# Patient Record
Sex: MALE | Race: BLACK OR AFRICAN AMERICAN | NOT HISPANIC OR LATINO | Employment: UNEMPLOYED | URBAN - METROPOLITAN AREA
[De-identification: names, ages, dates, MRNs, and addresses within clinical notes are randomized per-mention and may not be internally consistent; named-entity substitution may affect disease eponyms.]

---

## 2018-03-22 ENCOUNTER — HOSPITAL ENCOUNTER (EMERGENCY)
Facility: MEDICAL CENTER | Age: 38
End: 2018-03-22
Attending: EMERGENCY MEDICINE

## 2018-03-22 VITALS
DIASTOLIC BLOOD PRESSURE: 84 MMHG | OXYGEN SATURATION: 98 % | RESPIRATION RATE: 14 BRPM | HEART RATE: 64 BPM | WEIGHT: 221.12 LBS | BODY MASS INDEX: 33.51 KG/M2 | TEMPERATURE: 98.2 F | HEIGHT: 68 IN | SYSTOLIC BLOOD PRESSURE: 137 MMHG

## 2018-03-22 DIAGNOSIS — R11.2 NON-INTRACTABLE VOMITING WITH NAUSEA, UNSPECIFIED VOMITING TYPE: ICD-10-CM

## 2018-03-22 DIAGNOSIS — R53.83 FATIGUE, UNSPECIFIED TYPE: ICD-10-CM

## 2018-03-22 LAB
ALBUMIN SERPL BCP-MCNC: 4.3 G/DL (ref 3.2–4.9)
ALBUMIN/GLOB SERPL: 1.4 G/DL
ALP SERPL-CCNC: 61 U/L (ref 30–99)
ALT SERPL-CCNC: 15 U/L (ref 2–50)
ANION GAP SERPL CALC-SCNC: 9 MMOL/L (ref 0–11.9)
APPEARANCE UR: CLEAR
AST SERPL-CCNC: 14 U/L (ref 12–45)
BASOPHILS # BLD AUTO: 0.4 % (ref 0–1.8)
BASOPHILS # BLD: 0.03 K/UL (ref 0–0.12)
BILIRUB SERPL-MCNC: 0.6 MG/DL (ref 0.1–1.5)
BILIRUB UR QL STRIP.AUTO: NEGATIVE
BUN SERPL-MCNC: 12 MG/DL (ref 8–22)
CALCIUM SERPL-MCNC: 9.6 MG/DL (ref 8.5–10.5)
CHLORIDE SERPL-SCNC: 103 MMOL/L (ref 96–112)
CO2 SERPL-SCNC: 28 MMOL/L (ref 20–33)
COLOR UR: YELLOW
CREAT SERPL-MCNC: 0.98 MG/DL (ref 0.5–1.4)
EOSINOPHIL # BLD AUTO: 0.15 K/UL (ref 0–0.51)
EOSINOPHIL NFR BLD: 2.1 % (ref 0–6.9)
ERYTHROCYTE [DISTWIDTH] IN BLOOD BY AUTOMATED COUNT: 39.7 FL (ref 35.9–50)
GLOBULIN SER CALC-MCNC: 3.1 G/DL (ref 1.9–3.5)
GLUCOSE SERPL-MCNC: 99 MG/DL (ref 65–99)
GLUCOSE UR STRIP.AUTO-MCNC: NEGATIVE MG/DL
HCT VFR BLD AUTO: 49.7 % (ref 42–52)
HGB BLD-MCNC: 17.7 G/DL (ref 14–18)
IMM GRANULOCYTES # BLD AUTO: 0.01 K/UL (ref 0–0.11)
IMM GRANULOCYTES NFR BLD AUTO: 0.1 % (ref 0–0.9)
KETONES UR STRIP.AUTO-MCNC: NEGATIVE MG/DL
LEUKOCYTE ESTERASE UR QL STRIP.AUTO: NEGATIVE
LIPASE SERPL-CCNC: 27 U/L (ref 11–82)
LYMPHOCYTES # BLD AUTO: 1.19 K/UL (ref 1–4.8)
LYMPHOCYTES NFR BLD: 16.5 % (ref 22–41)
MCH RBC QN AUTO: 31.3 PG (ref 27–33)
MCHC RBC AUTO-ENTMCNC: 35.6 G/DL (ref 33.7–35.3)
MCV RBC AUTO: 88 FL (ref 81.4–97.8)
MICRO URNS: NORMAL
MONOCYTES # BLD AUTO: 0.48 K/UL (ref 0–0.85)
MONOCYTES NFR BLD AUTO: 6.6 % (ref 0–13.4)
NEUTROPHILS # BLD AUTO: 5.36 K/UL (ref 1.82–7.42)
NEUTROPHILS NFR BLD: 74.3 % (ref 44–72)
NITRITE UR QL STRIP.AUTO: NEGATIVE
NRBC # BLD AUTO: 0 K/UL
NRBC BLD-RTO: 0 /100 WBC
PH UR STRIP.AUTO: 5 [PH]
PLATELET # BLD AUTO: 217 K/UL (ref 164–446)
PMV BLD AUTO: 9.7 FL (ref 9–12.9)
POTASSIUM SERPL-SCNC: 3.9 MMOL/L (ref 3.6–5.5)
PROT SERPL-MCNC: 7.4 G/DL (ref 6–8.2)
PROT UR QL STRIP: NEGATIVE MG/DL
RBC # BLD AUTO: 5.65 M/UL (ref 4.7–6.1)
RBC UR QL AUTO: NEGATIVE
SODIUM SERPL-SCNC: 140 MMOL/L (ref 135–145)
SP GR UR STRIP.AUTO: 1.03
UROBILINOGEN UR STRIP.AUTO-MCNC: 1 MG/DL
WBC # BLD AUTO: 7.2 K/UL (ref 4.8–10.8)

## 2018-03-22 PROCEDURE — 83690 ASSAY OF LIPASE: CPT

## 2018-03-22 PROCEDURE — 700111 HCHG RX REV CODE 636 W/ 250 OVERRIDE (IP): Performed by: EMERGENCY MEDICINE

## 2018-03-22 PROCEDURE — 36415 COLL VENOUS BLD VENIPUNCTURE: CPT

## 2018-03-22 PROCEDURE — 85025 COMPLETE CBC W/AUTO DIFF WBC: CPT

## 2018-03-22 PROCEDURE — 99284 EMERGENCY DEPT VISIT MOD MDM: CPT

## 2018-03-22 PROCEDURE — 81003 URINALYSIS AUTO W/O SCOPE: CPT

## 2018-03-22 PROCEDURE — 80053 COMPREHEN METABOLIC PANEL: CPT

## 2018-03-22 RX ORDER — ONDANSETRON 4 MG/1
4 TABLET, ORALLY DISINTEGRATING ORAL ONCE
Status: COMPLETED | OUTPATIENT
Start: 2018-03-22 | End: 2018-03-22

## 2018-03-22 RX ORDER — ONDANSETRON 4 MG/1
4 TABLET, FILM COATED ORAL EVERY 4 HOURS PRN
Qty: 10 TAB | Refills: 0 | Status: SHIPPED | OUTPATIENT
Start: 2018-03-22 | End: 2020-01-03

## 2018-03-22 RX ADMIN — ONDANSETRON 4 MG: 4 TABLET, ORALLY DISINTEGRATING ORAL at 21:43

## 2018-03-23 NOTE — ED TRIAGE NOTES
C/O diarrhea approx 5-7 x/d, nausea , vomited x 2 in the  last 7 d, feels weak and lethargic, sx began 1 week ago, congested, no cough

## 2018-03-23 NOTE — ED NOTES
Patient dc'd to home w/ self. Patient alert and oriented x 4. Patient ambulatory w/ steady gait. Patient verbalizes understanding of discharge instructions, follow up care, and prescription x 1. Work note provided to patient by ERP. Patient denies questions upon discharge.

## 2018-03-23 NOTE — DISCHARGE INSTRUCTIONS
Please call your primary care physician tomorrow for complete recheck. Return to the emergency department if you develop any new or worsening symptoms including worsening pain, nausea or vomiting, fevers, if you're not able to drink fluids, or if you have any further concerns.      Viral Gastroenteritis, Adult  Introduction  Viral gastroenteritis is also known as the stomach flu. This condition is caused by certain germs (viruses). These germs can be passed from person to person very easily (are very contagious). This condition can cause sudden watery poop (diarrhea), fever, and throwing up (vomiting).  Having watery poop and throwing up can make you feel weak and cause you to get dehydrated. Dehydration can make you tired and thirsty, make you have a dry mouth, and make it so you pee (urinate) less often. Older adults and people with other diseases or a weak defense system (immune system) are at higher risk for dehydration. It is important to replace the fluids that you lose from having watery poop and throwing up.  Follow these instructions at home:  Follow instructions from your doctor about how to care for yourself at home.  Eating and drinking  Follow these instructions as told by your doctor:  · Take an oral rehydration solution (ORS). This is a drink that is sold at pharmacies and stores.  · Drink clear fluids in small amounts as you are able, such as:  ¨ Water.  ¨ Ice chips.  ¨ Diluted fruit juice.  ¨ Low-calorie sports drinks.  · Eat bland, easy-to-digest foods in small amounts as you are able, such as:  ¨ Bananas.  ¨ Applesauce.  ¨ Rice.  ¨ Low-fat (lean) meats.  ¨ Toast.  ¨ Crackers.  · Avoid fluids that have a lot of sugar or caffeine in them.  · Avoid alcohol.  · Avoid spicy or fatty foods.  General instructions  · Drink enough fluid to keep your pee (urine) clear or pale yellow.  · Wash your hands often. If you cannot use soap and water, use hand .  · Make sure that all people in your home  wash their hands well and often.  · Rest at home while you get better.  · Take over-the-counter and prescription medicines only as told by your doctor.  · Watch your condition for any changes.  · Take a warm bath to help with any burning or pain from having watery poop.  · Keep all follow-up visits as told by your doctor. This is important.  Contact a doctor if:  · You cannot keep fluids down.  · Your symptoms get worse.  · You have new symptoms.  · You feel light-headed or dizzy.  · You have muscle cramps.  Get help right away if:  · You have chest pain.  · You feel very weak or you pass out (faint).  · You see blood in your throw-up.  · Your throw-up looks like coffee grounds.  · You have bloody or black poop (stools) or poop that look like tar.  · You have a very bad headache, a stiff neck, or both.  · You have a rash.  · You have very bad pain, cramping, or bloating in your belly (abdomen).  · You have trouble breathing.  · You are breathing very quickly.  · Your heart is beating very quickly.  · Your skin feels cold and clammy.  · You feel confused.  · You have pain when you pee.  · You have signs of dehydration, such as:  ¨ Dark pee, hardly any pee, or no pee.  ¨ Cracked lips.  ¨ Dry mouth.  ¨ Sunken eyes.  ¨ Sleepiness.  ¨ Weakness.  This information is not intended to replace advice given to you by your health care provider. Make sure you discuss any questions you have with your health care provider.  Document Released: 06/05/2009 Document Revised: 07/07/2017 Document Reviewed: 08/23/2016  © 2017 [employer]vier

## 2018-03-23 NOTE — ED PROVIDER NOTES
ED Provider Note    Scribed for Amber Estrada M.D. by Lydia Drake. 3/22/2018, 9:14 PM.    Primary care provider: None noted  Means of arrival: Walk in  History obtained from: Patient  History limited by: None     CHIEF COMPLAINT  Nausea and Vomiting    HPI  Rajat Luong is a 37 y.o. male who presents to the Emergency Department for evaluation of nausea and vomiting onset about 5 days ago. He reports the vomiting has been persistent, but has not experienced any episodes of emesis today. The patient endorses associated mild abdominal pain and abdominal cramping. He also reports diarrhea onset 5 days ago, but states it has recently improved. No alleviating or exacerbating factors are identified at this time. States he has been trying to drink plenty of fluids, reports no noticeable loss of appetite.    The patient additionally identifies multiple cold like symptoms that include mild headaches, mild muscle aches, generalized fatigue, minor nasal congestion, and decreased appetite. He also reports subjective fevers, but did not have a thermometer at home to check his temperature. The patient denies chest pain, shortness of breath, cough, or dysuria.     REVIEW OF SYSTEMS  Pertinent positives include nausea, vomiting, abdominal pain, abdominal cramping, diarrhea, headache, muscle aches, generalized fatigue, nasal congestion, and decreased appetite. Pertinent negatives include no chest pain, shortness of breath, cough, or dysuria.  All other systems reviewed and negative.  C.     PAST MEDICAL HISTORY   Migraines    SURGICAL HISTORY  patient denies any surgical history    SOCIAL HISTORY  Social History   Substance Use Topics   • Smoking status: No   • Smokeless tobacco: None noted   • Alcohol use None noted      History   Drug use: Unknown       FAMILY HISTORY  No pertinent family history noted.    CURRENT MEDICATIONS  Home Medications    **Home medications have not yet been reviewed for this encounter**    "      ALLERGIES  No Known Allergies    PHYSICAL EXAM  Vital Signs: /84   Pulse 68   Temp 36.8 °C (98.2 °F)   Resp 15   Ht 1.727 m (5' 8\")   Wt 100.3 kg (221 lb 1.9 oz)   SpO2 93%   BMI 33.62 kg/m²   Constitutional: Alert, no acute distress, obese  HENT: Normocephalic, atraumatic, moist mucus membranes  Eyes: Pupils equal and reactive, normal conjunctiva, non-icteric  Neck: Supple, normal range of motion, no stridor  Cardiovascular: Regular rhythm, Normal peripheral perfusion, no cyanosis, Normal cardiac auscultation  Pulmonary: No respiratory distress, normal work of breathing, no accessory muscle usage, Clear to auscultation bilaterally  Abdomen: Soft, non tender, no peritoneal signs, bowel sounds are present, no right lower quadrant tenderness to palpation, no right upper quadrant tenderness to palpation, abdomen is nondistended.   Skin: Warm, dry, no rashes or lesions  Back: No pain with active range of motion  Musculoskeletal: Normal range of motion in all extremities, no swelling or deformity noted  Neurologic: Alert, oriented, normal motor function, no speech deficits  Psychiatric: Normal and appropriate mood and affect    DIAGNOSTIC STUDIES/PROCEDURES:    LABS  Labs Reviewed   CBC WITH DIFFERENTIAL - Abnormal; Notable for the following:        Result Value    MCHC 35.6 (*)     Neutrophils-Polys 74.30 (*)     Lymphocytes 16.50 (*)     All other components within normal limits   COMP METABOLIC PANEL   URINALYSIS   LIPASE   ESTIMATED GFR     All labs reviewed by me.    COURSE & MEDICAL DECISION MAKING  Pertinent Labs & Imaging studies reviewed. (See chart for details)    No recent medical records available for review.     Differential diagnoses include but are not limited to: Gastroenteritis, electrolyte abnormality, dehydration, intra-abdominal infection including appendicitis, cholecystitis, cholelithiasis, pancreatitis    9:14 PM - Patient seen and examined at bedside. Patient will be treated " with Zofran 4 mg. Ordered Lipase, Urinalysis, CMP, and CBC to evaluate his symptoms.     Decision Making:  This is a 37 y.o. year old male who presents with history and physical exam is documented above. 4-5 days ago he developed diarrhea and vomiting, both of these symptoms have improved at this time. He does still have some increased fatigue and mild loss of appetite, but is drinking fluids well. States he is sleeping more than usual as well. He reports subjective fevers but has not taken his temperature. On arrival to the emergency department he has a normal heart rate, no tachypnea, he is afebrile, no evidence of SIRS or sepsis by vital signs. His abdominal exam is benign, soft, nontender on physical exam. He has no right lower quadrant tenderness to suggest appendicitis, no right upper quadrant tenderness to suggest cholecystitis or cholelithiasis. No epigastric tenderness to suggest pancreatitis.    He was treated with a dose of Zofran in the emergency department.    On laboratory evaluation he has no evidence of urinary tract infection. All electrolytes are within normal limits. His lipase is 27 without evidence of pancreatitis. He has normal liver enzymes, no evidence of hepatitis. His white blood count is within normal limits, again less concerning for SIRS, sepsis, or ongoing severe infection.    Based on the fact that his symptoms appear to be improving, suspect this is a resolving viral illness. I do not believe antibiotic treatment is indicated at this time. He has complained of some residual nausea, I will discharge him with a prescription for Zofran. He will follow-up with his primary care physician for recheck within 24-48 hours. Did  him that I suspect his symptoms will continue to improve, stressed prompt return if he develops any new or worsening symptoms, specifically abdominal pain, fevers, recurrent nausea or vomiting, recurrent diarrhea, or any further concerns.    The patient will  return for new or worsening symptoms and is stable at the time of discharge.    The patient is referred to a primary physician for blood pressure management, diabetic screening, and for all other preventative health concerns.    DISPOSITION:  Patient will be discharged home in stable condition.    FOLLOW UP:  [facility]  [address] [place] [id number]  [phone]  Go in 1 day  For complete recheck    [facility], Emergency Dept  [address] [id number] [phone]  Go to  If symptoms worsen      OUTPATIENT MEDICATIONS:  Discharge Medication List as of 3/22/2018 11:08 PM      START taking these medications    Details   ondansetron (ZOFRAN) 4 MG Tab tablet 4 mg, EVERY 4 HOURS PRN Starting Thu 3/22/2018, Disp-10 Tab, R-0, Oral             FINAL IMPRESSION  1. Fatigue, unspecified type    2. Non-intractable vomiting with nausea, unspecified vomiting type          I, Lydia Drake (Chayoibmegan), am scribing for, and in the presence of, Amber Estrada M.D..    Electronically signed by: Lydia Drake (Scribe), 3/22/2018    IAmber M.D. personally performed the services described in this documentation, as scribed by Lydia Drake in my presence, and it is both accurate and complete.    The note accurately reflects work and decisions made by me.  Amber Estrada  3/22/2018  11:27 PM

## 2019-05-31 ENCOUNTER — NON-PROVIDER VISIT (OUTPATIENT)
Dept: OCCUPATIONAL MEDICINE | Facility: CLINIC | Age: 39
End: 2019-05-31

## 2019-05-31 DIAGNOSIS — Z02.1 PRE-EMPLOYMENT DRUG SCREENING: ICD-10-CM

## 2019-05-31 LAB
AMP AMPHETAMINE: NORMAL
COC COCAINE: NORMAL
INT CON NEG: NORMAL
INT CON POS: NORMAL
MET METHAMPHETAMINES: NORMAL
OPI OPIATES: NORMAL
PCP PHENCYCLIDINE: NORMAL
POC DRUG COMMENT 753798-OCCUPATIONAL HEALTH: NORMAL
THC: NORMAL

## 2019-05-31 PROCEDURE — 80305 DRUG TEST PRSMV DIR OPT OBS: CPT | Performed by: PREVENTIVE MEDICINE

## 2020-01-03 ENCOUNTER — APPOINTMENT (OUTPATIENT)
Dept: RADIOLOGY | Facility: IMAGING CENTER | Age: 40
End: 2020-01-03
Attending: FAMILY MEDICINE
Payer: COMMERCIAL

## 2020-01-03 ENCOUNTER — OCCUPATIONAL MEDICINE (OUTPATIENT)
Dept: URGENT CARE | Facility: CLINIC | Age: 40
End: 2020-01-03
Payer: COMMERCIAL

## 2020-01-03 VITALS
RESPIRATION RATE: 16 BRPM | WEIGHT: 187.83 LBS | OXYGEN SATURATION: 98 % | HEIGHT: 68 IN | SYSTOLIC BLOOD PRESSURE: 130 MMHG | BODY MASS INDEX: 28.47 KG/M2 | TEMPERATURE: 98 F | DIASTOLIC BLOOD PRESSURE: 68 MMHG | HEART RATE: 78 BPM

## 2020-01-03 DIAGNOSIS — M54.41 LOW BACK PAIN WITH RIGHT-SIDED SCIATICA, UNSPECIFIED BACK PAIN LATERALITY, UNSPECIFIED CHRONICITY: ICD-10-CM

## 2020-01-03 DIAGNOSIS — R20.2 LEFT LEG PARESTHESIAS: ICD-10-CM

## 2020-01-03 PROCEDURE — 99203 OFFICE O/P NEW LOW 30 MIN: CPT | Performed by: FAMILY MEDICINE

## 2020-01-03 PROCEDURE — 72100 X-RAY EXAM L-S SPINE 2/3 VWS: CPT | Mod: TC | Performed by: FAMILY MEDICINE

## 2020-01-03 RX ORDER — PREDNISONE 10 MG/1
TABLET ORAL
Qty: 21 TAB | Refills: 0 | Status: SHIPPED | OUTPATIENT
Start: 2020-01-03 | End: 2021-09-02

## 2020-01-03 ASSESSMENT — ENCOUNTER SYMPTOMS
BACK PAIN: 1
SENSORY CHANGE: 1

## 2020-01-03 NOTE — LETTER
[facility]  [address] [facility] [address] [facility], [address] [address]  Phone:  [phone] - Fax:  [phone]   [facility] Progress Report and Disability Certification  Date of Service: 1/3/2020   No Show:  No  Date / Time of Next Visit: 1/7/2020   Claim Information   Patient Name: Rajat Luong  Claim Number:     Employer: [employer]  Date of Injury: 12/1/2019     Insurer / TPA: [employer]  ID / SSN:     Occupation: Fullfilment Specialist  Diagnosis: Diagnoses of Low back pain with right-sided sciatica, unspecified back pain laterality, unspecified chronicity and Left leg paresthesias were pertinent to this visit.    Medical Information   Related to Industrial Injury? Yes    Subjective Complaints:  Date of injury December 1, 2019  39-year-old male who is here for the first time in our urgent care for evaluation of back injury that happened early December while he was at work.  He reports that he does usually lift some heavy object frequently.  He was moving one object to the side noticed pain in the lower lumbar area which has persisted.  He decided next day to go to seek care at [facility] which was his own doctor who reportedly placed him on rest for 3 weeks.  Currently he is back at work does a lot of walking around and is on light duty but still having a lot of pain.  He was given muscle relaxer which she does not remember but is not even taking it because it does not help.  He denies any loss of bladder or bowel control but he has pain which is in the lower back but also mostly in the right side which also radiates down to the buttock and slightly lower above the knee but also has noticed numbness in the right leg since this injury last month.  He denies any prior back pain, injuries in the past prior to December 1.  He just recently realized that he needed to come to [facility] urgent care for evaluation of this.   Objective Findings:  Physical Exam   Constitutional: He is oriented to person, place, and time. He appears well-developed and well-nourished. No distress.   HENT:   Head: Normocephalic and atraumatic.   Right Ear: External ear normal.   Nose: Nose normal.   Eyes: Conjunctivae are normal.   Cardiovascular: Normal rate.   Pulmonary/Chest: Effort normal. No stridor. No respiratory distress.   Musculoskeletal:      Lumbar back: He exhibits decreased range of motion, tenderness (Tenderness noted in the paraspinal muscle groups mostly on the right but also on the left and somewhat in the center.) and bony tenderness. He exhibits no swelling, no edema, no deformity, no laceration and normal pulse.        Back:    Neurological: He is alert and oriented to person, place, and time. He has normal strength. A sensory deficit (Patient reports different sensation almost in the entire right lower extremity) is present.   Reflex Scores:       Patellar reflexes are 2+ on the right side and 2+ on the left side.       Achilles reflexes are 2+ on the right side and 2+ on the left side.  Unequivocal right straight leg raise  Negative straight leg raise on the left   Skin: Skin is warm. No rash noted. He is not diaphoretic. No erythema. No pallor.   Psychiatric: He has a normal mood and affect.      Pre-Existing Condition(s):     Assessment:   Initial Visit    Status: Additional Care Required  Permanent Disability:No    Plan: ConsultationTransfer Care  Comments:Oral prednisone taper, immediate referral to occupational medicine    Diagnostics: X-ray  Comments:Lumbar x-ray was normal    Comments:       Disability Information   Status: Temporarily Totally Disabled    From:  1/3/2020  Through: 1/7/2020 Restrictions are: Temporary   Physical Restrictions   Sitting:    Standing:    Stooping:    Bending:      Squatting:    Walking:    Climbing:    Pushing:      Pulling:    Other:    Reaching Above Shoulder (L):   Reaching Above Shoulder (R):       Reaching Below  Shoulder (L):    Reaching Below Shoulder (R):      Not to exceed Weight Limits   Carrying(hrs):   Weight Limit(lb):   Lifting(hrs):   Weight  Limit(lb):     Comments: Oral steroid taper as directed  Icing or heat frequently  Rest  Referral to occupational medicine is done and they should be contacting you early next week.   follow-up in urgent care on January 7 unless you are able to see occupational medicine on that day or sooner in which case the care is transferred  If you have any worsening symptoms make sure you come back sooner and be seen    Repetitive Actions   Hands: i.e. Fine Manipulations from Grasping:     Feet: i.e. Operating Foot Controls:     Driving / Operate Machinery:     Physician Name: Lay Hanson M.D. Physician Signature: LAY Sequeira M.D. e-Signature: Dr. Brandon Schmitz, Medical Director   Clinic Name / Location: [address], [address] [place] [address] Clinic Phone Number: Dept: [phone]   Appointment Time: 4:45 Pm Visit Start Time: 5:01 PM   Check-In Time:  4:46 Pm Visit Discharge Time:  5:47 PM   Original-Treating Physician or Chiropractor    Page 2-Insurer/TPA    Page 3-Employer    Page 4-Employee

## 2020-01-03 NOTE — LETTER
"EMPLOYEE’S CLAIM FOR COMPENSATION/ REPORT OF INITIAL TREATMENT  FORM C-4    EMPLOYEE’S CLAIM - PROVIDE ALL INFORMATION REQUESTED   First Name  Rajat Last Name  Luong Birthdate                    1980                Sex  male Claim Number   Home Address  [address] Age  39 y.o. 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 (if applicable)  I was loading and Stacking boxes in a trailer    How did this injury or occupational disease occur? (Be specific an answer in detail. Use additional sheet if necessary)  Lift a heavy box and stacking it.   If you believe that you have an occupational disease, when did you first have knowledge of the disability and it relationship to your employment?  n/a Witnesses to the Accident  n/a      Nature of Injury or Occupational Disease  Workers' Compensation  Part(s) of Body Injured or Affected  Lower Back Area (Lumbar Area & Lumbo-Sacral), ,     I certify that the above is true and correct to the best of my knowledge and that I have provided this " information in order to obtain the benefits of [place]  I hereby authorize any physician, chiropractor, surgeon, practitioner, or other person, any hospital, including [facility] or [facility], any medical service organization, any insurance company, or other institution or organization to release to each other, any medical or other information, including benefits paid or payable, pertinent to this injury or disease, except information relative to diagnosis, treatment and/or counseling for AIDS, psychological conditions, alcohol or controlled substances, for which I must give specific authorization.  A Photostat of this authorization shall be as valid as the original.     Date 1/3/20   Place [facility]   Employee’s Signature   THIS REPORT MUST BE COMPLETED AND MAILED WITHIN 3 WORKING DAYS OF TREATMENT   Place  [facility]  Name of Facility  [facility]   Date  1/3/2020 Diagnosis  (M54.41) Low back pain with right-sided sciatica, unspecified back pain laterality, unspecified chronicity  (R20.2) Left leg paresthesias Is there evidence the injured employee was under the influence of alcohol and/or another controlled substance at the time of accident?   Hour  5:01 PM Description of Injury or Disease  Diagnoses of Low back pain with right-sided sciatica, unspecified back pain laterality, unspecified chronicity and Left leg paresthesias were pertinent to this visit. No   Treatment  Resting  Icing or heat frequently  Oral steroid taper as directed  Follow-up on January 7th, sooner if any worsening or new symptoms  Referral to occupational medicine as soon as possible    Have you advised the patient to remain off work five days or more? No   X-Ray Findings  Negative   If Yes   From Date  To Date      From information given by the employee, together with medical evidence, can  "you directly connect this injury or occupational disease as job incurred?  Yes If No Full Duty No Modified Duty  No   Is additional medical care by a physician indicated?  Yes If Modified Duty, Specify any Limitations / Restrictions      Do you know of any previous injury or disease contributing to this condition or occupational disease?                            No   Date  1/3/2020 Print Doctor’s Name Lay Hanson M.D. I certify the employer’s copy of  this form was mailed on:   Address  [address] Insurer’s Use Only     [facility] Zip  [address]    Provider’s Tax ID Number  [id number] Telephone  Dept: [phone]        e-LAY Dickey M.D.   e-Signature: Dr. Brandon Schmitz,   Medical Director Degree  MD        ORIGINAL-TREATING PHYSICIAN OR CHIROPRACTOR    PAGE 2-INSURER/TPA    PAGE 3-EMPLOYER    PAGE 4-EMPLOYEE             Form C-4 (rev.10/07)              BRIEF DESCRIPTION OF RIGHTS AND BENEFITS  (Pursuant to NRS 616C.050)    Notice of Injury or Occupational Disease (Incident Report Form C-1): If an injury or occupational disease (OD) arises out of and in the course of employment, you must provide written notice to your employer as soon as practicable, but no later than 7 days after the accident or OD. Your employer shall maintain a sufficient supply of the required forms.    Claim for Compensation (Form C-4): If medical treatment is sought, the form C-4 is available at the place of initial treatment. A completed \"Claim for Compensation\" (Form C-4) must be filed within 90 days after an accident or OD. The treating physician or chiropractor must, within 3 working days after treatment, complete and mail to the employer, the employer's insurer and third-party , the Claim for Compensation.    Medical Treatment: If you require medical treatment for your on-the-job injury or OD, you may be required to select a physician or chiropractor from a list provided by your " workers’ compensation insurer, if it has contracted with an Organization for Managed Care (MCO) or Preferred Provider Organization (PPO) or providers of health care. If your employer has not entered into a contract with an MCO or PPO, you may select a physician or chiropractor from the Panel of Physicians and Chiropractors. Any medical costs related to your industrial injury or OD will be paid by your insurer.    Temporary Total Disability (TTD): If your doctor has certified that you are unable to work for a period of at least 5 consecutive days, or 5 cumulative days in a 20-day period, or places restrictions on you that your employer does not accommodate, you may be entitled to TTD compensation.    Temporary Partial Disability (TPD): If the wage you receive upon reemployment is less than the compensation for TTD to which you are entitled, the insurer may be required to pay you TPD compensation to make up the difference. TPD can only be paid for a maximum of 24 months.    Permanent Partial Disability (PPD): When your medical condition is stable and there is an indication of a PPD as a result of your injury or OD, within 30 days, your insurer must arrange for an evaluation by a rating physician or chiropractor to determine the degree of your PPD. The amount of your PPD award depends on the date of injury, the results of the PPD evaluation and your age and wage.    Permanent Total Disability (PTD): If you are medically certified by a treating physician or chiropractor as permanently and totally disabled and have been granted a PTD status by your insurer, you are entitled to receive monthly benefits not to exceed 66 2/3% of your average monthly wage. The amount of your PTD payments is subject to reduction if you previously received a PPD award.    Vocational Rehabilitation Services: You may be eligible for vocational rehabilitation services if you are unable to return to the job due to a permanent physical impairment  or permanent restrictions as a result of your injury or occupational disease.    Transportation and Per Patrice Reimbursement: You may be eligible for travel expenses and per patrice associated with medical treatment.    Reopening: You may be able to reopen your claim if your condition worsens after claim closure.    Appeal Process: If you disagree with a written determination issued by the insurer or the insurer does not respond to your request, you may appeal to the Department of Administration, , by following the instructions contained in your determination letter. You must appeal the determination within 70 days from the date of the determination letter at [address], [address], [place], [place] [address], or [address], [address], [place], [place] [address]. If you disagree with the  decision, you may appeal to the Department of Administration, . You must file your appeal within 30 days from the date of the  decision letter at [address], [address], [place], [place] [address], or [address], [address], [place], [place] [address]. If you disagree with a decision of an , you may file a petition for judicial review with the District Court. You must do so within 30 days of the Appeal Officer’s decision. You may be represented by an  at your own expense or you may contact the [facility] for possible representation.    [place]  for Injured Workers (NAIW): If you disagree with a  decision, you may request that NAIW represent you without charge at an  Hearing. For information regarding denial of benefits, you may contact the [facility] at: [address], [address], [place] [address], [phone], or [address], [address] [address], [phone]    To File a Complaint with the Division: If you wish to file a complaint with the  of  the Division of Industrial Relations (DIR),  please contact the Workers’ Compensation Section, [address], [address], [place], [place] [address], telephone [phone]) [phone], or [address], [place], [place] [address], telephone [phone].    For assistance with Workers’ Compensation Issues: You may contact the Office of the Governor Consumer Health Assistance, [address] [place] [address], [address], [place], [place] [address], Toll Free 1-978.305.2449, Web site: [email or web address] To You.[facility].[place]., E-mail [email or web address].                   __________________________________________________________________                                                     ___1/3/20______        Employee Name / Signature                                                                                                                                              Date                                                                                                                                                                                                     D-2 (rev. 06/18)

## 2020-01-04 NOTE — PROGRESS NOTES
"Subjective:      Rajat Luong is a 39 y.o. male who presents with Back Pain (WC New, Pt states he is experiencing pain/numbness radiating from his lower back through his RT foot. Sx started 1m ago. )      Date of injury December 1, 2019  39-year-old male who is here for the first time in our urgent care for evaluation of back injury that happened early December while he was at work.  He reports that he does usually lift some heavy object frequently.  He was moving one object to the side noticed pain in the lower lumbar area which has persisted.  He decided next day to go to seek care at [facility] which was his own doctor who reportedly placed him on rest for 3 weeks.  Currently he is back at work does a lot of walking around and is on light duty but still having a lot of pain.  He was given muscle relaxer which she does not remember but is not even taking it because it does not help.  He denies any loss of bladder or bowel control but he has pain which is in the lower back but also mostly in the right side which also radiates down to the buttock and slightly lower above the knee but also has noticed numbness in the right leg since this injury last month.  He denies any prior back pain, injuries in the past prior to December 1.  He just recently realized that he needed [facility] this.     Back Pain         Review of Systems   Musculoskeletal: Positive for back pain.   Neurological: Positive for sensory change.   All other systems reviewed and are negative.         Objective:     /68   Pulse 78   Temp 36.7 °C (98 °F) (Temporal)   Resp 16   Ht 1.727 m (5' 8\")   Wt 85.2 kg (187 lb 13.3 oz)   SpO2 98%   BMI 28.56 kg/m²      Physical Exam    Physical Exam   Constitutional: He is oriented to person, place, and time. He appears well-developed and well-nourished. No distress.   HENT:   Head: Normocephalic and atraumatic.   Right Ear: External ear " normal.   Nose: Nose normal.   Eyes: Conjunctivae are normal.   Cardiovascular: Normal rate.   Pulmonary/Chest: Effort normal. No stridor. No respiratory distress.   Musculoskeletal:      Lumbar back: He exhibits decreased range of motion, tenderness (Tenderness noted in the paraspinal muscle groups mostly on the right but also on the left and somewhat in the center.) and bony tenderness. He exhibits no swelling, no edema, no deformity, no laceration and normal pulse.        Back:    Neurological: He is alert and oriented to person, place, and time. He has normal strength. A sensory deficit (Patient reports different sensation almost in the entire right lower extremity) is present.   Reflex Scores:       Patellar reflexes are 2+ on the right side and 2+ on the left side.       Achilles reflexes are 2+ on the right side and 2+ on the left side.  Unequivocal right straight leg raise  Negative straight leg raise on the left   Skin: Skin is warm. No rash noted. He is not diaphoretic. No erythema. No pallor.   Psychiatric: He has a normal mood and affect.     Lumbar x-rays negative for acute abnormalities     Assessment/Plan:       1. Low back pain with right-sided sciatica, unspecified back pain laterality, unspecified chronicity  - REFERRAL TO OCCUPATIONAL MEDICINE  - DX-LUMBAR SPINE-2 OR 3 VIEWS; Future  - predniSONE (DELTASONE) 10 MG Tab; Start 60 mg prednisone on day one, 50 mg PO on day two, 40mg PO on day three, 30 mg on day four, 20 mg on day five, 10 mg p.o. on last day  Dispense: 21 Tab; Refill: 0    2. Left leg paresthesias  - REFERRAL TO OCCUPATIONAL MEDICINE  - DX-LUMBAR SPINE-2 OR 3 VIEWS; Future  - predniSONE (DELTASONE) 10 MG Tab; Start 60 mg prednisone on day one, 50 mg PO on day two, 40mg PO on day three, 30 mg on day four, 20 mg on day five, 10 mg p.o. on last day  Dispense: 21 Tab; Refill: 0      Referral to occupational medicine  Oral prednisone taper  Icing or heat as needed  Follow-up on January 7  in the urgent care unless we are able to have this patient see occupational medicine in which case the care is transferred  Follow-up sooner if any worsening or new symptoms

## 2020-01-04 NOTE — PROGRESS NOTES
Physical Exam   Constitutional: He is oriented to person, place, and time. He appears well-developed and well-nourished. No distress.   HENT:   Head: Normocephalic and atraumatic.   Right Ear: External ear normal.   Nose: Nose normal.   Eyes: Conjunctivae are normal.   Cardiovascular: Normal rate.   Pulmonary/Chest: Effort normal. No stridor. No respiratory distress.   Musculoskeletal:      Lumbar back: He exhibits decreased range of motion, tenderness (Tenderness noted in the paraspinal muscle groups mostly on the right but also on the left and somewhat in the center.) and bony tenderness. He exhibits no swelling, no edema, no deformity, no laceration and normal pulse.        Back:    Neurological: He is alert and oriented to person, place, and time. He has normal strength. A sensory deficit (Patient reports different sensation almost in the entire right lower extremity) is present.   Reflex Scores:       Patellar reflexes are 2+ on the right side and 2+ on the left side.       Achilles reflexes are 2+ on the right side and 2+ on the left side.  Unequivocal right straight leg raise  Negative straight leg raise on the left   Skin: Skin is warm. No rash noted. He is not diaphoretic. No erythema. No pallor.   Psychiatric: He has a normal mood and affect.

## 2020-01-07 NOTE — PROGRESS NOTES
"Subjective:      Rajat Luong is a 39 y.o. male who presents with Back Pain (WC New, Pt states he is experiencing pain/numbness radiating from his lower back through his RT foot. Sx started 1m ago. )      Date of injury December 1, 2019  39-year-old male who is here for the first time in our urgent care for evaluation of back injury that happened early December while he was at work.  He reports that he does usually lift some heavy object frequently.  He was moving one object to the side noticed pain in the lower lumbar area which has persisted.  He decided next day to go to seek care at [facility] which was his own doctor who reportedly placed him on rest for 3 weeks.  Currently he is back at work does a lot of walking around and is on light duty but still having a lot of pain.  He was given muscle relaxer which she does not remember but is not even taking it because it does not help.  He denies any loss of bladder or bowel control but he has pain which is in the lower back but also mostly in the right side which also radiates down to the buttock and slightly lower above the knee but also has noticed numbness in the right leg since this injury last month.  He denies any prior back pain, injuries in the past prior to December 1.  He just recently realized that he needed to come to [facility] for evaluation of this.     HPI    ROS       Objective:     /68   Pulse 78   Temp 36.7 °C (98 °F) (Temporal)   Resp 16   Ht 1.727 m (5' 8\")   Wt 85.2 kg (187 lb 13.3 oz)   SpO2 98%   BMI 28.56 kg/m²      Physical Exam    Physical Exam   Constitutional: He is oriented to person, place, and time. He appears well-developed and well-nourished. No distress.   HENT:   Head: Normocephalic and atraumatic.   Right Ear: External ear normal.   Nose: Nose normal.   Eyes: Conjunctivae are normal.   Cardiovascular: Normal rate.   Pulmonary/Chest: Effort normal. No stridor. No respiratory distress. "   Musculoskeletal:      Lumbar back: He exhibits decreased range of motion, tenderness (Tenderness noted in the paraspinal muscle groups mostly on the right but also on the left and somewhat in the center.) and bony tenderness. He exhibits no swelling, no edema, no deformity, no laceration and normal pulse.        Back:    Neurological: He is alert and oriented to person, place, and time. He has normal strength. A sensory deficit (Patient reports different sensation almost in the entire right lower extremity) is present.   Reflex Scores:       Patellar reflexes are 2+ on the right side and 2+ on the left side.       Achilles reflexes are 2+ on the right side and 2+ on the left side.  Unequivocal right straight leg raise  Negative straight leg raise on the left   Skin: Skin is warm. No rash noted. He is not diaphoretic. No erythema. No pallor.   Psychiatric: He has a normal mood and affect.          Assessment/Plan:       1. Low back pain with right-sided sciatica, unspecified back pain laterality, unspecified chronicity  - REFERRAL TO OCCUPATIONAL MEDICINE  - DX-LUMBAR SPINE-2 OR 3 VIEWS; Future  - predniSONE (DELTASONE) 10 MG Tab; Start 60 mg prednisone on day one, 50 mg PO on day two, 40mg PO on day three, 30 mg on day four, 20 mg on day five, 10 mg p.o. on last day  Dispense: 21 Tab; Refill: 0    2. Left leg paresthesias  - REFERRAL TO OCCUPATIONAL MEDICINE  - DX-LUMBAR SPINE-2 OR 3 VIEWS; Future  - predniSONE (DELTASONE) 10 MG Tab; Start 60 mg prednisone on day one, 50 mg PO on day two, 40mg PO on day three, 30 mg on day four, 20 mg on day five, 10 mg p.o. on last day  Dispense: 21 Tab; Refill: 0      Light duty, no lifting more than 5 lbs. Avoid frequent bending  Trial of prednisone and we discussed it may not work  Icing or heat prn  Follow up in a week, referral to [facility]

## 2020-01-13 ENCOUNTER — OCCUPATIONAL MEDICINE (OUTPATIENT)
Dept: OCCUPATIONAL MEDICINE | Facility: CLINIC | Age: 40
End: 2020-01-13
Payer: COMMERCIAL

## 2020-01-13 VITALS
DIASTOLIC BLOOD PRESSURE: 86 MMHG | TEMPERATURE: 99.8 F | HEIGHT: 68 IN | OXYGEN SATURATION: 95 % | SYSTOLIC BLOOD PRESSURE: 142 MMHG | RESPIRATION RATE: 14 BRPM | BODY MASS INDEX: 28.37 KG/M2 | WEIGHT: 187.2 LBS | HEART RATE: 72 BPM

## 2020-01-13 DIAGNOSIS — S39.012D STRAIN OF LUMBAR REGION, SUBSEQUENT ENCOUNTER: ICD-10-CM

## 2020-01-13 PROCEDURE — 99204 OFFICE O/P NEW MOD 45 MIN: CPT | Performed by: PREVENTIVE MEDICINE

## 2020-01-13 RX ORDER — DICLOFENAC SODIUM 75 MG/1
75 TABLET, DELAYED RELEASE ORAL 2 TIMES DAILY
Qty: 60 TAB | Refills: 0 | Status: SHIPPED | OUTPATIENT
Start: 2020-01-13 | End: 2021-09-02

## 2020-01-13 RX ORDER — TIZANIDINE 4 MG/1
4 TABLET ORAL
Qty: 20 TAB | Refills: 0 | Status: SHIPPED | OUTPATIENT
Start: 2020-01-13 | End: 2021-09-02

## 2020-01-13 NOTE — LETTER
[address],   [address] [address]  Phone:  [phone] - Fax:  [phone]   Occupational Health [facility] Progress Report and Disability Certification  Date of Service: 1/13/2020   No Show:  No  Date / Time of Next Visit: 2/4/2020 @ 3pm   Claim Information   Patient Name: Rajat Luong  Claim Number:     Employer: [employer]  Date of Injury: 12/1/2019     Insurer / TPA: [employer]  ID / SSN:     Occupation: Fullfilment Specialist  Diagnosis: The encounter diagnosis was Strain of lumbar region, subsequent encounter.    Medical Information   Related to Industrial Injury? Yes    Subjective Complaints:  DOI 12/1/2019: 40 yo male presents for low back injury. He was moving one object to the side noticed pain in the lower lumbar area which has persisted.  He was seen in [facility] and taken off work for 3 weeks.  He then followed up with [facility] urgent care where x-rays of the lumbar spine were normal.  He was also given a prescription for prednisone.  Patient states that overall symptoms are the same.  Continues to have right-sided lower back pain with radiating pain down the right leg and occasional numbness on the right foot.  He states lately she has been taking over-the-counter ibuprofen and Tylenol for pain which does not seem to help much.  He states over the last month or so he is only done light duty for about a week.  He states his pain is worse with prolonged standing or walking, bending or twisting.  Denies significant lower back injuries.   Objective Findings: Lumbar: No gross deformity.  Tenderness over the right paraspinal musculature and SI joint.  Range of motion diminished to about 70 degrees flexion.  Painful rotation bilaterally.  Straight leg test negative.  Reflexes intact.  Strength intact.   Pre-Existing Condition(s):     Assessment:   Condition Same    Status: Additional Care Required  Permanent  Disability:No    Plan:      Diagnostics:      Comments:  Referral to physical therapy  Referral for MRI lumbar given duration of radicular symptoms  Prescribed diclofenac and tizanidine  Restricted duty  Follow-up 3 weeks, sooner if MRI performed sooner    Disability Information   Status: Released to Restricted Duty    From:  1/13/2020  Through: 2/4/2020 Restrictions are: Temporary   Physical Restrictions   Sitting:    Standing:  < or = to 4 hrs/day Stooping:  < or = to 1 hr/day Bending:  < or = to 1 hr/day   Squatting:    Walking:  < or = to 4 hrs/day Climbing:    Pushing:      Pulling:    Other:    Reaching Above Shoulder (L):   Reaching Above Shoulder (R):       Reaching Below Shoulder (L):    Reaching Below Shoulder (R):      Not to exceed Weight Limits   Carrying(hrs):   Weight Limit(lb): < or = to 10 pounds Lifting(hrs):   Weight  Limit(lb): < or = to 10 pounds   Comments: Seated work at least 50% of shift. Alternate seating and standing.    Repetitive Actions   Hands: i.e. Fine Manipulations from Grasping:     Feet: i.e. Operating Foot Controls:     Driving / Operate Machinery:     Physician Name: Justin Pollack D.O. Physician Signature: JSUTIN Parada D.O. e-Signature: Dr. Brandon Schmitz, Medical Director   Clinic Name / Location: [address],   [address] [place] [address] Clinic Phone Number: Dept: [phone]   Appointment Time: 3:45 Pm Visit Start Time: 4:00 PM   Check-In Time:  3:54 Pm Visit Discharge Time:  4:34pm   Original-Treating Physician or Chiropractor    Page 2-Insurer/TPA    Page 3-Employer    Page 4-Employee
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
## 2020-02-04 ENCOUNTER — TELEPHONE (OUTPATIENT)
Dept: OCCUPATIONAL MEDICINE | Facility: CLINIC | Age: 40
End: 2020-02-04

## 2020-07-07 ENCOUNTER — APPOINTMENT (OUTPATIENT)
Dept: GENERAL RADIOLOGY | Age: 40
End: 2020-07-07
Attending: PHYSICIAN ASSISTANT

## 2020-07-07 ENCOUNTER — HOSPITAL ENCOUNTER (EMERGENCY)
Age: 40
Discharge: HOME | End: 2020-07-07

## 2020-07-07 VITALS
WEIGHT: 175 LBS | HEART RATE: 87 BPM | BODY MASS INDEX: 22.46 KG/M2 | TEMPERATURE: 97.7 F | RESPIRATION RATE: 16 BRPM | SYSTOLIC BLOOD PRESSURE: 130 MMHG | DIASTOLIC BLOOD PRESSURE: 82 MMHG | OXYGEN SATURATION: 100 % | HEIGHT: 74 IN

## 2020-07-07 DIAGNOSIS — M54.2 NECK PAIN ON LEFT SIDE: Primary | ICD-10-CM

## 2020-07-07 DIAGNOSIS — Z87.81 H/O CERVICAL FRACTURE: ICD-10-CM

## 2020-07-07 PROCEDURE — 72040 X-RAY EXAM NECK SPINE 2-3 VW: CPT

## 2020-07-07 PROCEDURE — 99283 EMERGENCY DEPT VISIT LOW MDM: CPT

## 2020-07-07 PROCEDURE — 72040 X-RAY EXAM NECK SPINE 2-3 VW: CPT | Performed by: RADIOLOGY

## 2020-07-07 PROCEDURE — 99283 EMERGENCY DEPT VISIT LOW MDM: CPT | Performed by: PHYSICIAN ASSISTANT

## 2020-07-07 PROCEDURE — 10002803 HB RX 637: Performed by: PHYSICIAN ASSISTANT

## 2020-07-07 RX ORDER — LIDOCAINE 4 G/G
1 PATCH TOPICAL ONCE
Status: COMPLETED | OUTPATIENT
Start: 2020-07-07 | End: 2020-07-07

## 2020-07-07 RX ORDER — IBUPROFEN 400 MG/1
800 TABLET ORAL ONCE
Status: COMPLETED | OUTPATIENT
Start: 2020-07-07 | End: 2020-07-07

## 2020-07-07 RX ORDER — BACLOFEN 10 MG/1
10 TABLET ORAL 3 TIMES DAILY PRN
Qty: 15 TABLET | Refills: 0 | Status: SHIPPED | OUTPATIENT
Start: 2020-07-07 | End: 2020-07-16

## 2020-07-07 RX ORDER — LIDOCAINE 50 MG/G
1 PATCH TOPICAL EVERY 24 HOURS
Qty: 30 PATCH | Refills: 0 | Status: SHIPPED | OUTPATIENT
Start: 2020-07-07 | End: 2020-07-16

## 2020-07-07 RX ADMIN — LIDOCAINE 1 PATCH: 246 PATCH TOPICAL at 13:30

## 2020-07-07 RX ADMIN — IBUPROFEN 800 MG: 400 TABLET ORAL at 13:30

## 2020-07-07 ASSESSMENT — PAIN DESCRIPTION - PAIN TYPE: TYPE: ACUTE PAIN

## 2020-07-07 ASSESSMENT — PAIN SCALES - GENERAL: PAINLEVEL_OUTOF10: 8

## 2020-07-16 ENCOUNTER — IMAGING SERVICES (OUTPATIENT)
Dept: GENERAL RADIOLOGY | Age: 40
End: 2020-07-16
Attending: ORTHOPAEDIC SURGERY

## 2020-07-16 ENCOUNTER — OFFICE VISIT (OUTPATIENT)
Dept: ORTHOPEDICS | Age: 40
End: 2020-07-16
Attending: PHYSICIAN ASSISTANT

## 2020-07-16 VITALS — RESPIRATION RATE: 16 BRPM | BODY MASS INDEX: 21.22 KG/M2 | WEIGHT: 165.34 LBS | HEIGHT: 74 IN

## 2020-07-16 DIAGNOSIS — M54.2 NECK PAIN: ICD-10-CM

## 2020-07-16 DIAGNOSIS — M54.2 NECK PAIN: Primary | ICD-10-CM

## 2020-07-16 PROCEDURE — 99243 OFF/OP CNSLTJ NEW/EST LOW 30: CPT | Performed by: ORTHOPAEDIC SURGERY

## 2020-07-16 PROCEDURE — 72050 X-RAY EXAM NECK SPINE 4/5VWS: CPT | Performed by: RADIOLOGY

## 2020-07-21 ENCOUNTER — HOSPITAL ENCOUNTER (OUTPATIENT)
Dept: REHABILITATION | Age: 40
Discharge: STILL ADMITTED OR ADMINISTRATIVE | End: 2020-07-21
Attending: ORTHOPAEDIC SURGERY

## 2020-07-21 DIAGNOSIS — M54.2 NECK PAIN: ICD-10-CM

## 2020-07-21 PROCEDURE — 97110 THERAPEUTIC EXERCISES: CPT | Performed by: PHYSICAL THERAPIST

## 2020-07-21 PROCEDURE — 10004173 HB COUNTER-THERAPY VISIT PT: Performed by: PHYSICAL THERAPIST

## 2020-07-21 PROCEDURE — 97140 MANUAL THERAPY 1/> REGIONS: CPT | Performed by: PHYSICAL THERAPIST

## 2020-07-21 PROCEDURE — 97161 PT EVAL LOW COMPLEX 20 MIN: CPT | Performed by: PHYSICAL THERAPIST

## 2020-07-21 ASSESSMENT — ENCOUNTER SYMPTOMS
QUALITY: BURNING
ALLEVIATING FACTORS: AVOIDING MOVEMENT IN INVOLVED AREA
QUALITY: SORE
PAIN SEVERITY NOW: 8
PAIN SCALE AT HIGHEST: 10
ALLEVIATING FACTORS: REST
ALLEVIATING FACTORS: RELAXATION TECHNIQUES
QUALITY: ACHE
ALLEVIATING FACTORS: HEAT
QUALITY: STIFF
PAIN FREQUENCY: CONSTANT

## 2020-07-30 ENCOUNTER — HOSPITAL ENCOUNTER (OUTPATIENT)
Dept: REHABILITATION | Age: 40
Discharge: STILL ADMITTED OR ADMINISTRATIVE | End: 2020-07-30
Attending: ORTHOPAEDIC SURGERY

## 2020-07-30 PROCEDURE — 97110 THERAPEUTIC EXERCISES: CPT | Performed by: PHYSICAL THERAPIST

## 2020-07-30 PROCEDURE — 97140 MANUAL THERAPY 1/> REGIONS: CPT | Performed by: PHYSICAL THERAPIST

## 2020-07-30 PROCEDURE — 10004173 HB COUNTER-THERAPY VISIT PT: Performed by: PHYSICAL THERAPIST

## 2020-07-30 ASSESSMENT — ENCOUNTER SYMPTOMS
PAIN FREQUENCY: INTERMITTENT
PAIN SEVERITY NOW: 4

## 2020-08-03 ENCOUNTER — HOSPITAL ENCOUNTER (OUTPATIENT)
Dept: REHABILITATION | Age: 40
Discharge: STILL ADMITTED OR ADMINISTRATIVE | End: 2020-08-03
Attending: ORTHOPAEDIC SURGERY

## 2020-08-03 PROCEDURE — 97110 THERAPEUTIC EXERCISES: CPT | Performed by: PHYSICAL THERAPIST

## 2020-08-03 PROCEDURE — 97140 MANUAL THERAPY 1/> REGIONS: CPT | Performed by: PHYSICAL THERAPIST

## 2020-08-03 PROCEDURE — 10004173 HB COUNTER-THERAPY VISIT PT: Performed by: PHYSICAL THERAPIST

## 2020-08-10 ENCOUNTER — HOSPITAL ENCOUNTER (OUTPATIENT)
Dept: REHABILITATION | Age: 40
Discharge: STILL ADMITTED OR ADMINISTRATIVE | End: 2020-08-10
Attending: ORTHOPAEDIC SURGERY

## 2020-08-10 PROCEDURE — 97140 MANUAL THERAPY 1/> REGIONS: CPT | Performed by: PHYSICAL THERAPIST

## 2020-08-10 PROCEDURE — 10004173 HB COUNTER-THERAPY VISIT PT: Performed by: PHYSICAL THERAPIST

## 2020-08-10 PROCEDURE — 97110 THERAPEUTIC EXERCISES: CPT | Performed by: PHYSICAL THERAPIST

## 2020-08-10 ASSESSMENT — ENCOUNTER SYMPTOMS
PAIN SEVERITY NOW: 0
SUBJECTIVE PAIN PROGRESSION: IMPROVED

## 2021-09-02 ENCOUNTER — HOSPITAL ENCOUNTER (EMERGENCY)
Facility: MEDICAL CENTER | Age: 41
End: 2021-09-02
Attending: EMERGENCY MEDICINE

## 2021-09-02 ENCOUNTER — APPOINTMENT (OUTPATIENT)
Dept: RADIOLOGY | Facility: MEDICAL CENTER | Age: 41
End: 2021-09-02
Attending: EMERGENCY MEDICINE

## 2021-09-02 VITALS
TEMPERATURE: 98 F | WEIGHT: 214.95 LBS | RESPIRATION RATE: 16 BRPM | OXYGEN SATURATION: 93 % | BODY MASS INDEX: 32.58 KG/M2 | SYSTOLIC BLOOD PRESSURE: 125 MMHG | HEIGHT: 68 IN | HEART RATE: 58 BPM | DIASTOLIC BLOOD PRESSURE: 80 MMHG

## 2021-09-02 DIAGNOSIS — R11.2 NAUSEA AND VOMITING, INTRACTABILITY OF VOMITING NOT SPECIFIED, UNSPECIFIED VOMITING TYPE: ICD-10-CM

## 2021-09-02 DIAGNOSIS — R42 DIZZINESS: ICD-10-CM

## 2021-09-02 LAB
ALBUMIN SERPL BCP-MCNC: 4.6 G/DL (ref 3.2–4.9)
ALBUMIN/GLOB SERPL: 1.7 G/DL
ALP SERPL-CCNC: 77 U/L (ref 30–99)
ALT SERPL-CCNC: 15 U/L (ref 2–50)
ANION GAP SERPL CALC-SCNC: 9 MMOL/L (ref 7–16)
AST SERPL-CCNC: 14 U/L (ref 12–45)
BASOPHILS # BLD AUTO: 0.5 % (ref 0–1.8)
BASOPHILS # BLD: 0.04 K/UL (ref 0–0.12)
BILIRUB SERPL-MCNC: 0.4 MG/DL (ref 0.1–1.5)
BUN SERPL-MCNC: 4 MG/DL (ref 8–22)
CALCIUM SERPL-MCNC: 9.9 MG/DL (ref 8.4–10.2)
CHLORIDE SERPL-SCNC: 96 MMOL/L (ref 96–112)
CO2 SERPL-SCNC: 36 MMOL/L (ref 20–33)
CREAT SERPL-MCNC: 0.75 MG/DL (ref 0.5–1.4)
EKG IMPRESSION: NORMAL
EOSINOPHIL # BLD AUTO: 0.14 K/UL (ref 0–0.51)
EOSINOPHIL NFR BLD: 1.9 % (ref 0–6.9)
ERYTHROCYTE [DISTWIDTH] IN BLOOD BY AUTOMATED COUNT: 43.4 FL (ref 35.9–50)
GLOBULIN SER CALC-MCNC: 2.7 G/DL (ref 1.9–3.5)
GLUCOSE SERPL-MCNC: 118 MG/DL (ref 65–99)
HCT VFR BLD AUTO: 50.9 % (ref 42–52)
HGB BLD-MCNC: 17.5 G/DL (ref 14–18)
IMM GRANULOCYTES # BLD AUTO: 0.03 K/UL (ref 0–0.11)
IMM GRANULOCYTES NFR BLD AUTO: 0.4 % (ref 0–0.9)
LIPASE SERPL-CCNC: 19 U/L (ref 7–58)
LYMPHOCYTES # BLD AUTO: 1.2 K/UL (ref 1–4.8)
LYMPHOCYTES NFR BLD: 16.1 % (ref 22–41)
MCH RBC QN AUTO: 31.8 PG (ref 27–33)
MCHC RBC AUTO-ENTMCNC: 34.4 G/DL (ref 33.7–35.3)
MCV RBC AUTO: 92.4 FL (ref 81.4–97.8)
MONOCYTES # BLD AUTO: 0.46 K/UL (ref 0–0.85)
MONOCYTES NFR BLD AUTO: 6.2 % (ref 0–13.4)
NEUTROPHILS # BLD AUTO: 5.6 K/UL (ref 1.82–7.42)
NEUTROPHILS NFR BLD: 74.9 % (ref 44–72)
NRBC # BLD AUTO: 0 K/UL
NRBC BLD-RTO: 0 /100 WBC
PLATELET # BLD AUTO: 233 K/UL (ref 164–446)
PMV BLD AUTO: 9.4 FL (ref 9–12.9)
POTASSIUM SERPL-SCNC: 4.1 MMOL/L (ref 3.6–5.5)
PROT SERPL-MCNC: 7.3 G/DL (ref 6–8.2)
RBC # BLD AUTO: 5.51 M/UL (ref 4.7–6.1)
SODIUM SERPL-SCNC: 141 MMOL/L (ref 135–145)
WBC # BLD AUTO: 7.5 K/UL (ref 4.8–10.8)

## 2021-09-02 PROCEDURE — 700117 HCHG RX CONTRAST REV CODE 255: Performed by: EMERGENCY MEDICINE

## 2021-09-02 PROCEDURE — 80053 COMPREHEN METABOLIC PANEL: CPT

## 2021-09-02 PROCEDURE — 700102 HCHG RX REV CODE 250 W/ 637 OVERRIDE(OP): Performed by: EMERGENCY MEDICINE

## 2021-09-02 PROCEDURE — 85025 COMPLETE CBC W/AUTO DIFF WBC: CPT

## 2021-09-02 PROCEDURE — 700111 HCHG RX REV CODE 636 W/ 250 OVERRIDE (IP): Performed by: EMERGENCY MEDICINE

## 2021-09-02 PROCEDURE — 83690 ASSAY OF LIPASE: CPT

## 2021-09-02 PROCEDURE — 93005 ELECTROCARDIOGRAM TRACING: CPT | Performed by: EMERGENCY MEDICINE

## 2021-09-02 PROCEDURE — 99284 EMERGENCY DEPT VISIT MOD MDM: CPT

## 2021-09-02 PROCEDURE — A9270 NON-COVERED ITEM OR SERVICE: HCPCS | Performed by: EMERGENCY MEDICINE

## 2021-09-02 PROCEDURE — 70496 CT ANGIOGRAPHY HEAD: CPT

## 2021-09-02 RX ORDER — MECLIZINE HYDROCHLORIDE 25 MG/1
25 TABLET ORAL ONCE
Status: COMPLETED | OUTPATIENT
Start: 2021-09-02 | End: 2021-09-02

## 2021-09-02 RX ORDER — ONDANSETRON 4 MG/1
4 TABLET, ORALLY DISINTEGRATING ORAL EVERY 8 HOURS PRN
Qty: 10 TABLET | Refills: 1 | Status: SHIPPED | OUTPATIENT
Start: 2021-09-02

## 2021-09-02 RX ORDER — ONDANSETRON 4 MG/1
4 TABLET, ORALLY DISINTEGRATING ORAL EVERY 6 HOURS PRN
Qty: 10 TABLET | Refills: 0 | Status: SHIPPED | OUTPATIENT
Start: 2021-09-02 | End: 2021-09-02 | Stop reason: SDUPTHER

## 2021-09-02 RX ORDER — ONDANSETRON 4 MG/1
4 TABLET, ORALLY DISINTEGRATING ORAL ONCE
Status: COMPLETED | OUTPATIENT
Start: 2021-09-02 | End: 2021-09-02

## 2021-09-02 RX ORDER — MECLIZINE HYDROCHLORIDE 25 MG/1
25 TABLET ORAL 3 TIMES DAILY PRN
Qty: 30 TABLET | Refills: 0 | Status: SHIPPED | OUTPATIENT
Start: 2021-09-02

## 2021-09-02 RX ADMIN — IOHEXOL 80 ML: 350 INJECTION, SOLUTION INTRAVENOUS at 14:07

## 2021-09-02 RX ADMIN — MECLIZINE HYDROCHLORIDE 25 MG: 25 TABLET ORAL at 12:44

## 2021-09-02 RX ADMIN — ONDANSETRON 4 MG: 4 TABLET, ORALLY DISINTEGRATING ORAL at 11:24

## 2021-09-02 NOTE — ED PROVIDER NOTES
"ED Provider Note  CHIEF COMPLAINT  Chief Complaint   Patient presents with   • N/V     x 4 days   • Dizziness   • Weakness       HPI  Rajat Luong is a 40 y.o. male who presents complaining of dizziness with nausea vomiting.    Patient states his symptoms started several days ago.  He believes he had a stomach ailment that then led to the dizziness. He states the dizziness might be described as room spinning and is worse with walking and moving around and possibly with turning his head.  He denies any visual changes and states he is supposed to wear glasses and has been having trouble seeing the TV at times but denies any visual loss or acute visual changes.  Patient has been vomiting and had a couple episodes of diarrhea yesterday.  He reports a frontal headache with the symptoms as well.     Patient denies chills, head trauma, sick contacts, rash, photophobia and neck stiffness.      ALLERGIES  No Known Allergies    CURRENT MEDICATIONS  Home Medications     Reviewed by Teresa Moseley R.N. (Registered Nurse) on 09/02/21 at 1010  Med List Status: Complete   Medication Last Dose Status        Patient Robert Taking any Medications                       PAST MEDICAL HISTORY     Denies    SURGICAL HISTORY  patient denies any surgical history    SOCIAL HISTORY  Social History     Tobacco Use   • Smoking status: Never Smoker   • Smokeless tobacco: Current User     Types: Chew   Vaping Use   • Vaping Use: Never used   Substance and Sexual Activity   • Alcohol use: Not Currently   • Drug use: Not Currently   • Sexual activity: Not on file       Family Hx:  No family history of cerebral aneurysms      REVIEW OF SYSTEMS  See HPI for further details.  All other systems are negative except as above in HPI.      PHYSICAL EXAM  VITAL SIGNS: /76   Pulse (!) 56   Temp 36.7 °C (98 °F) (Oral)   Resp 16   Ht 1.727 m (5' 8\")   Wt 97.5 kg (214 lb 15.2 oz)   SpO2 94%   BMI 32.68 kg/m²     General:  WDWN male, nontoxic " appearing in NAD; A+Ox3; V/S as above   Skin: warm and dry; good color; no rash  HEENT: NCAT; EOMs intact; PERRL; no scleral icterus; no nystagmus  Neck: FROM; no meningismus, soft  Cardiovascular: Regular heart rate and rhythm.  No murmurs, rubs, or gallops; pulses 2+ bilaterally radially  Lungs: Clear to auscultation with good air movement bilaterally.  No wheezes, rhonchi, or rales.   Abdomen: BS present; soft; NTND; no rebound, guarding, or rigidity.  No organomegaly or pulsatile mass  Extremities: GOODWIN x 4; no e/o trauma; no pedal edema; neg Bonnie's  Neurologic: CNs III-XII formally intact; speech clear; distal sensation intact; strength 5/5 UE/LEs  Psychiatric: Appropriate affect, normal mood    LABS  Results for orders placed or performed during the hospital encounter of 09/02/21   CBC WITH DIFFERENTIAL   Result Value Ref Range    WBC 7.5 4.8 - 10.8 K/uL    RBC 5.51 4.70 - 6.10 M/uL    Hemoglobin 17.5 14.0 - 18.0 g/dL    Hematocrit 50.9 42.0 - 52.0 %    MCV 92.4 81.4 - 97.8 fL    MCH 31.8 27.0 - 33.0 pg    MCHC 34.4 33.7 - 35.3 g/dL    RDW 43.4 35.9 - 50.0 fL    Platelet Count 233 164 - 446 K/uL    MPV 9.4 9.0 - 12.9 fL    Neutrophils-Polys 74.90 (H) 44.00 - 72.00 %    Lymphocytes 16.10 (L) 22.00 - 41.00 %    Monocytes 6.20 0.00 - 13.40 %    Eosinophils 1.90 0.00 - 6.90 %    Basophils 0.50 0.00 - 1.80 %    Immature Granulocytes 0.40 0.00 - 0.90 %    Nucleated RBC 0.00 /100 WBC    Neutrophils (Absolute) 5.60 1.82 - 7.42 K/uL    Lymphs (Absolute) 1.20 1.00 - 4.80 K/uL    Monos (Absolute) 0.46 0.00 - 0.85 K/uL    Eos (Absolute) 0.14 0.00 - 0.51 K/uL    Baso (Absolute) 0.04 0.00 - 0.12 K/uL    Immature Granulocytes (abs) 0.03 0.00 - 0.11 K/uL    NRBC (Absolute) 0.00 K/uL   COMP METABOLIC PANEL   Result Value Ref Range    Sodium 141 135 - 145 mmol/L    Potassium 4.1 3.6 - 5.5 mmol/L    Chloride 96 96 - 112 mmol/L    Co2 36 (H) 20 - 33 mmol/L    Anion Gap 9.0 7.0 - 16.0    Glucose 118 (H) 65 - 99 mg/dL    Bun 4 (L)  8 - 22 mg/dL    Creatinine 0.75 0.50 - 1.40 mg/dL    Calcium 9.9 8.4 - 10.2 mg/dL    AST(SGOT) 14 12 - 45 U/L    ALT(SGPT) 15 2 - 50 U/L    Alkaline Phosphatase 77 30 - 99 U/L    Total Bilirubin 0.4 0.1 - 1.5 mg/dL    Albumin 4.6 3.2 - 4.9 g/dL    Total Protein 7.3 6.0 - 8.2 g/dL    Globulin 2.7 1.9 - 3.5 g/dL    A-G Ratio 1.7 g/dL   LIPASE   Result Value Ref Range    Lipase 19 7 - 58 U/L   ESTIMATED GFR   Result Value Ref Range    GFR If African American >60 >60 mL/min/1.73 m 2    GFR If Non African American >60 >60 mL/min/1.73 m 2   EKG   Result Value Ref Range    Report       [facility] Emergency Dept.    Test Date:  2021  Pt Name:    RAJAT ROLLINS              Department: [facility]  MRN:        [id number]                      Room:  Gender:     Male                         Technician:   :        1980                   Requested By:MAGGIE ROMERO  Order #:    [id number]                    Reading MD: MAGGIE ROMERO MD    Measurements  Intervals                                Axis  Rate:       47                           P:          35  RI:         168                          QRS:        72  QRSD:       100                          T:          51  QT:         452  QTc:        400    Interpretive Statements  SINUS BRADYCARDIA  No previous ECG available for comparison  Electronically Signed On 2021 14:25:54 PDT by MAGGIE ROMERO MD       IMAGING  CT-CTA HEAD WITH & W/O-POST PROCESS   Final Result         1. No hemodynamically significant narrowing of the major intracranial vessels.            MEDICAL RECORD  I have reviewed patient's medical record and pertinent results are listed below.      COURSE & MEDICAL DECISION MAKING  I have reviewed any medical record information, laboratory studies and radiographic results as noted.    Rajat Rollins is a 40 y.o. male who presents complaining of nausea, vomiting, dizziness.  Patient seems to have components of both  lightheadedness and vertigo.     Appropriate PPE was worn at all times while interacting with the patient.    Labs demonstrate a glucose of 118, BUN 4, CO2 36.    NS bolus was ordered for possible dehydration related to patient's sxs of vomiting and diarrhea.    Patient was given Zofran and Antivert.    CT CTA of the head ordered and negative for acute pathology.    P.o. challenge ordered.  If tolerated, patient may be discharged with Zofran and Antivert.  I advised him to return for continued or worsening symptoms.  I advised him to establish care with a PCP for follow-up.      FINAL IMPRESSION  1. Nausea and vomiting, intractability of vomiting not specified, unspecified vomiting type     2. Dizziness         Electronically signed by: Christine Orellana M.D., 9/2/2021 11:15 AM

## 2021-09-02 NOTE — ED NOTES
Pt discharged in stable condition. Pt was prescribed antivert and zofran by the physician. Pt instructed to take medications as prescribed, follow up with PCP, return to the ER if symptoms worsen. PIV removed, tip intact.

## 2021-09-02 NOTE — ED NOTES
Pt ambulated around unit. Denies Nausea, dizziness, or lightheadedness. MD notified. OK for discharge.

## 2021-09-02 NOTE — ED TRIAGE NOTES
Pt amb to triage with   Chief Complaint   Patient presents with   • N/V     x 4 days   • Dizziness   • Weakness     Pt reports he has been ill with n/v x 4 days and vomits every time he eats. Pt states he had a fever last night, afebrile in triage.

## 2021-09-02 NOTE — DISCHARGE INSTRUCTIONS
Take Zofran as needed for nausea  Take Antivert as needed for dizziness  Drink plenty of fluids  Return to the ER for ongoing or worsening symptoms    Please follow-up with ophthalmology to check your vision.    Please follow-up with a primary care doctor at the [facility] or [facility] clinics    Your CTA of the brain today was normal.  Please establish care with a primary care doctor who can see you for follow-up to make sure you are doing better and order further testing as needed.

## 2022-05-24 ENCOUNTER — APPOINTMENT (OUTPATIENT)
Dept: RADIOLOGY | Facility: MEDICAL CENTER | Age: 42
End: 2022-05-24
Attending: EMERGENCY MEDICINE

## 2022-05-24 ENCOUNTER — HOSPITAL ENCOUNTER (EMERGENCY)
Facility: MEDICAL CENTER | Age: 42
End: 2022-05-24
Attending: EMERGENCY MEDICINE

## 2022-05-24 VITALS
BODY MASS INDEX: 28.04 KG/M2 | RESPIRATION RATE: 18 BRPM | DIASTOLIC BLOOD PRESSURE: 90 MMHG | SYSTOLIC BLOOD PRESSURE: 145 MMHG | TEMPERATURE: 98 F | WEIGHT: 185 LBS | HEART RATE: 90 BPM | HEIGHT: 68 IN | OXYGEN SATURATION: 98 %

## 2022-05-24 DIAGNOSIS — S21.211A STAB WOUND OF RIGHT SIDE OF BACK, INITIAL ENCOUNTER: ICD-10-CM

## 2022-05-24 PROCEDURE — 99284 EMERGENCY DEPT VISIT MOD MDM: CPT

## 2022-05-24 PROCEDURE — 700111 HCHG RX REV CODE 636 W/ 250 OVERRIDE (IP): Performed by: EMERGENCY MEDICINE

## 2022-05-24 PROCEDURE — 305308 HCHG STAPLER,SKIN,DISP.

## 2022-05-24 PROCEDURE — 71045 X-RAY EXAM CHEST 1 VIEW: CPT

## 2022-05-24 PROCEDURE — 307744 HCHG RED TRAUMA TEAM SERVICES

## 2022-05-24 PROCEDURE — 99284 EMERGENCY DEPT VISIT MOD MDM: CPT | Performed by: SURGERY

## 2022-05-24 PROCEDURE — 304217 HCHG IRRIGATION SYSTEM

## 2022-05-24 PROCEDURE — 304999 HCHG REPAIR-SIMPLE/INTERMED LEVEL 1

## 2022-05-24 RX ADMIN — LIDOCAINE HYDROCHLORIDE 4 ML: 10 INJECTION, SOLUTION EPIDURAL; INFILTRATION; INTRACAUDAL; PERINEURAL at 03:30

## 2022-05-24 ASSESSMENT — PAIN DESCRIPTION - PAIN TYPE: TYPE: ACUTE PAIN
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
## 2022-05-24 NOTE — ED NOTES
ERP at bedside for staple placement, pt refusing for wound to be closed. Wife at bedside, ERP provided education on importance of staples. Pt stating wanting to leave, refusing education.
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
## 2022-05-24 NOTE — ED NOTES
DC instructions reviewed with pt. Pt verbalized understanding. Pt ambulated to [facility] with steady gait.
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
## 2022-05-27 ENCOUNTER — HOSPITAL ENCOUNTER (EMERGENCY)
Facility: MEDICAL CENTER | Age: 42
End: 2022-05-27
Attending: EMERGENCY MEDICINE
Payer: MEDICAID

## 2022-05-27 VITALS
OXYGEN SATURATION: 95 % | HEIGHT: 68 IN | BODY MASS INDEX: 27.63 KG/M2 | RESPIRATION RATE: 14 BRPM | DIASTOLIC BLOOD PRESSURE: 60 MMHG | TEMPERATURE: 97.7 F | WEIGHT: 182.32 LBS | SYSTOLIC BLOOD PRESSURE: 120 MMHG | HEART RATE: 98 BPM

## 2022-05-27 DIAGNOSIS — L08.9 WOUND INFECTION: ICD-10-CM

## 2022-05-27 DIAGNOSIS — T14.8XXA WOUND INFECTION: ICD-10-CM

## 2022-05-27 PROCEDURE — A9270 NON-COVERED ITEM OR SERVICE: HCPCS | Performed by: EMERGENCY MEDICINE

## 2022-05-27 PROCEDURE — 99283 EMERGENCY DEPT VISIT LOW MDM: CPT

## 2022-05-27 PROCEDURE — 700102 HCHG RX REV CODE 250 W/ 637 OVERRIDE(OP): Performed by: EMERGENCY MEDICINE

## 2022-05-27 RX ORDER — AMOXICILLIN AND CLAVULANATE POTASSIUM 875; 125 MG/1; MG/1
1 TABLET, FILM COATED ORAL ONCE
Status: COMPLETED | OUTPATIENT
Start: 2022-05-27 | End: 2022-05-27

## 2022-05-27 RX ORDER — AMOXICILLIN AND CLAVULANATE POTASSIUM 875; 125 MG/1; MG/1
1 TABLET, FILM COATED ORAL 2 TIMES DAILY
Qty: 20 TABLET | Refills: 0 | Status: SHIPPED | OUTPATIENT
Start: 2022-05-27 | End: 2022-06-06

## 2022-05-27 RX ORDER — HYDROCODONE BITARTRATE AND ACETAMINOPHEN 5; 325 MG/1; MG/1
1 TABLET ORAL ONCE
Status: COMPLETED | OUTPATIENT
Start: 2022-05-27 | End: 2022-05-27

## 2022-05-27 RX ADMIN — HYDROCODONE BITARTRATE AND ACETAMINOPHEN 1 TABLET: 5; 325 TABLET ORAL at 22:27

## 2022-05-27 RX ADMIN — AMOXICILLIN AND CLAVULANATE POTASSIUM 1 TABLET: 875; 125 TABLET, FILM COATED ORAL at 22:27

## 2022-05-27 ASSESSMENT — FIBROSIS 4 INDEX: FIB4 SCORE: 0.62
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
## 2022-05-28 NOTE — ED NOTES
Patient educated on discharge instructions, follow up appointments, prescriptions, and home care. Patient verbalized understanding. Patient ambulated well to [facility].
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
## 2022-06-04 ENCOUNTER — HOSPITAL ENCOUNTER (EMERGENCY)
Facility: MEDICAL CENTER | Age: 42
End: 2022-06-04
Attending: EMERGENCY MEDICINE

## 2022-06-04 VITALS
HEIGHT: 68 IN | RESPIRATION RATE: 16 BRPM | DIASTOLIC BLOOD PRESSURE: 79 MMHG | BODY MASS INDEX: 27 KG/M2 | HEART RATE: 86 BPM | TEMPERATURE: 98.3 F | SYSTOLIC BLOOD PRESSURE: 132 MMHG | WEIGHT: 178.13 LBS | OXYGEN SATURATION: 96 %

## 2022-06-04 DIAGNOSIS — Z48.02 ENCOUNTER FOR STAPLE REMOVAL: ICD-10-CM

## 2022-06-04 PROCEDURE — 99282 EMERGENCY DEPT VISIT SF MDM: CPT

## 2022-06-04 ASSESSMENT — FIBROSIS 4 INDEX: FIB4 SCORE: 0.62

## 2022-06-04 NOTE — ED NOTES
Discharge instructions given to pt. Pt understood instructions and had no further questions or concerns.
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
## 2022-06-04 NOTE — DISCHARGE INSTRUCTIONS
Establish care with a primary care provider who can follow-up on your symptoms of numbness/pain.  You may require physical therapy or MRI imaging.    Return to the ER for fever, swelling, drainage, increasing pain, or other concerns.    Finish your antibiotics as prescribed

## 2022-09-09 ENCOUNTER — HOSPITAL ENCOUNTER (EMERGENCY)
Facility: MEDICAL CENTER | Age: 42
End: 2022-09-09
Attending: EMERGENCY MEDICINE
Payer: MEDICAID

## 2022-09-09 VITALS
DIASTOLIC BLOOD PRESSURE: 68 MMHG | RESPIRATION RATE: 16 BRPM | BODY MASS INDEX: 28.33 KG/M2 | TEMPERATURE: 98.2 F | WEIGHT: 186.95 LBS | OXYGEN SATURATION: 96 % | HEART RATE: 71 BPM | HEIGHT: 68 IN | SYSTOLIC BLOOD PRESSURE: 112 MMHG

## 2022-09-09 DIAGNOSIS — M25.511 ACUTE PAIN OF RIGHT SHOULDER: ICD-10-CM

## 2022-09-09 PROCEDURE — 99283 EMERGENCY DEPT VISIT LOW MDM: CPT

## 2022-09-09 RX ORDER — HYDROCODONE BITARTRATE AND ACETAMINOPHEN 5; 325 MG/1; MG/1
1 TABLET ORAL EVERY 4 HOURS PRN
Qty: 15 TABLET | Refills: 0 | Status: SHIPPED | OUTPATIENT
Start: 2022-09-09 | End: 2022-09-14

## 2022-09-09 ASSESSMENT — LIFESTYLE VARIABLES
ON A TYPICAL DAY WHEN YOU DRINK ALCOHOL HOW MANY DRINKS DO YOU HAVE: 0
HAVE YOU EVER FELT YOU SHOULD CUT DOWN ON YOUR DRINKING: NO
TOTAL SCORE: 0
DO YOU DRINK ALCOHOL: NO
TOTAL SCORE: 0
TOTAL SCORE: 0
AVERAGE NUMBER OF DAYS PER WEEK YOU HAVE A DRINK CONTAINING ALCOHOL: 0
HOW MANY TIMES IN THE PAST YEAR HAVE YOU HAD 5 OR MORE DRINKS IN A DAY: 0
HAVE PEOPLE ANNOYED YOU BY CRITICIZING YOUR DRINKING: NO
EVER FELT BAD OR GUILTY ABOUT YOUR DRINKING: NO
EVER HAD A DRINK FIRST THING IN THE MORNING TO STEADY YOUR NERVES TO GET RID OF A HANGOVER: NO
CONSUMPTION TOTAL: NEGATIVE

## 2022-09-09 ASSESSMENT — FIBROSIS 4 INDEX: FIB4 SCORE: 0.62
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
## 2022-09-09 NOTE — ED NOTES
Sling applied per orders. Pt AOx4 and ready for education. All discharge instructions given to pt as well as Rx for Norco.  Pt advised not to drink or drive.  Pt verbalized understanding of all discharge instructions. All lines removed prior to discharge. All questions answered. Pt to lobby via ambulation.
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
## 2022-12-14 ENCOUNTER — APPOINTMENT (OUTPATIENT)
Dept: RADIOLOGY | Facility: MEDICAL CENTER | Age: 42
End: 2022-12-14
Attending: EMERGENCY MEDICINE
Payer: MEDICAID

## 2022-12-14 ENCOUNTER — HOSPITAL ENCOUNTER (EMERGENCY)
Facility: MEDICAL CENTER | Age: 42
End: 2022-12-14
Attending: EMERGENCY MEDICINE
Payer: MEDICAID

## 2022-12-14 VITALS
RESPIRATION RATE: 14 BRPM | HEART RATE: 90 BPM | OXYGEN SATURATION: 90 % | HEIGHT: 68 IN | WEIGHT: 180 LBS | SYSTOLIC BLOOD PRESSURE: 130 MMHG | BODY MASS INDEX: 27.28 KG/M2 | DIASTOLIC BLOOD PRESSURE: 75 MMHG | TEMPERATURE: 97.8 F

## 2022-12-14 DIAGNOSIS — F15.10 AMPHETAMINE ABUSE (HCC): ICD-10-CM

## 2022-12-14 DIAGNOSIS — R56.9 SEIZURE-LIKE ACTIVITY (HCC): ICD-10-CM

## 2022-12-14 LAB
AMPHET UR QL SCN: POSITIVE
APPEARANCE UR: CLEAR
BARBITURATES UR QL SCN: NEGATIVE
BENZODIAZ UR QL SCN: NEGATIVE
BILIRUB UR QL STRIP.AUTO: NEGATIVE
BZE UR QL SCN: NEGATIVE
CANNABINOIDS UR QL SCN: POSITIVE
COLOR UR: YELLOW
GLUCOSE UR STRIP.AUTO-MCNC: NEGATIVE MG/DL
KETONES UR STRIP.AUTO-MCNC: NEGATIVE MG/DL
LEUKOCYTE ESTERASE UR QL STRIP.AUTO: NEGATIVE
METHADONE UR QL SCN: POSITIVE
MICRO URNS: NORMAL
NITRITE UR QL STRIP.AUTO: NEGATIVE
OPIATES UR QL SCN: NEGATIVE
OXYCODONE UR QL SCN: NEGATIVE
PCP UR QL SCN: NEGATIVE
PH UR STRIP.AUTO: 7.5 [PH] (ref 5–8)
PROPOXYPH UR QL SCN: NEGATIVE
PROT UR QL STRIP: NEGATIVE MG/DL
RBC UR QL AUTO: NEGATIVE
SP GR UR STRIP.AUTO: 1.03
UROBILINOGEN UR STRIP.AUTO-MCNC: 0.2 MG/DL

## 2022-12-14 PROCEDURE — 99284 EMERGENCY DEPT VISIT MOD MDM: CPT

## 2022-12-14 PROCEDURE — 81003 URINALYSIS AUTO W/O SCOPE: CPT | Mod: XU

## 2022-12-14 PROCEDURE — 94760 N-INVAS EAR/PLS OXIMETRY 1: CPT

## 2022-12-14 PROCEDURE — 80307 DRUG TEST PRSMV CHEM ANLYZR: CPT

## 2022-12-14 PROCEDURE — 70450 CT HEAD/BRAIN W/O DYE: CPT

## 2022-12-14 ASSESSMENT — FIBROSIS 4 INDEX: FIB4 SCORE: 0.64

## 2022-12-14 NOTE — ED TRIAGE NOTES
"Chief Complaint   Patient presents with    Seizure     Arrives with c/o of possible seizure at 0900 today  Girlfriend states she saw him having full body seizure like activity that did not last long, then he began to zone off and was confused, x1 vomit, no trauma   Unknown seizure history, not on seizure meds, states last seizure may have been 1 month ago  Pt arrives lethargic, falling asleep while questioning, denies drug use, girlfriend giving history       Tired     /83   Pulse 79   Temp 36.4 °C (97.6 °F) (Temporal)   Resp 18   Ht 1.727 m (5' 8\")   Wt 81.6 kg (180 lb)   SpO2 97%   BMI 27.37 kg/m²     Pt made aware of triage process, placed back into lobby, educated pt to tell staff of any worsening of symptoms     "

## 2022-12-15 NOTE — ED PROVIDER NOTES
ED Provider Note    Scribed for Margaret Gabriel M.D. by Jaki Antonio. 12/14/2022, 5:04 PM.    Primary care provider: Pcp Pt States None  Means of arrival: Walk In  History obtained from: Patient  History limited by: None    CHIEF COMPLAINT  Chief Complaint   Patient presents with    Seizure     Arrives with c/o of possible seizure at 0900 today  Girlfriend states she saw him having full body seizure like activity that did not last long, then he began to zone off and was confused, x1 vomit, no trauma   Unknown seizure history, not on seizure meds, states last seizure may have been 1 month ago  Pt arrives lethargic, falling asleep while questioning, denies drug use, girlfriend giving history       Tired       HPI  Rajat Luong is a 42 y.o. male who presents to the Emergency Department with his girlfriend for a witnessed seizure at 9AM this morning. She reports full body shaking followed by a state of confusion and tiredness, which has not resolved. He vomited once but did not bite his tongue or lose control of his bladder. She notes they had a stressful night last night, but did not drink and he did not fall and hit his head. They also deny drug use or accidental exposure to drugs. He has had similar episodes before, and is supposed to follow up with Neurology, but hasn't. Patient denies diarrhea, chest pains, cough, shortness of breath, dysuria, or urinary frequency.     REVIEW OF SYSTEMS  Pertinent positives include seizure-like activity, confusion, tiredness, and vomiting. Pertinent negatives include no diarrhea, chest pains, cough, shortness of breath, dysuria, or urinary frequency. All other systems reviewed and negative.     PAST MEDICAL HISTORY   has a past medical history of Patient denies medical problems.    SURGICAL HISTORY  patient denies any surgical history    SOCIAL HISTORY  Social History     Tobacco Use    Smoking status: Never    Smokeless tobacco: Never   Vaping Use    Vaping Use: Never  "used   Substance Use Topics    Alcohol use: Yes     Comment: occasionally    Drug use: Yes     Types: Inhaled     Comment: marijuana- daily      Social History     Substance and Sexual Activity   Drug Use Yes    Types: Inhaled    Comment: marijuana- daily       FAMILY HISTORY  History reviewed. No pertinent family history.    CURRENT MEDICATIONS  Home Medications       Reviewed by Bry Prieto R.N. (Registered Nurse) on 12/14/22 at 1514  Med List Status: Not Addressed     Medication Last Dose Status   meclizine (ANTIVERT) 25 MG Tab  Active   ondansetron (ZOFRAN ODT) 4 MG TABLET DISPERSIBLE  Active                    ALLERGIES  No Known Allergies    PHYSICAL EXAM  VITAL SIGNS: /83   Pulse 79   Temp 36.4 °C (97.6 °F) (Temporal)   Resp 18   Ht 1.727 m (5' 8\")   Wt 81.6 kg (180 lb)   SpO2 97%   BMI 27.37 kg/m²     Constitutional: Sleepy but arousable to voice. Well developed, No acute distress, Non-toxic appearance.   HENT: Normocephalic, Atraumatic, Bilateral external ears normal, No oral trauma, Nose normal.   Eyes: PERRL, EOMI, Conjunctiva normal.   Neck: Normal range of motion, No tenderness, Supple. No meningeal signs.    Cardiovascular: Normal heart rate, Normal rhythm.    Thorax & Lungs: Normal breath sounds, No respiratory distress.    Abdomen: Benign abdominal exam, no tenderness, no distention, no guarding, no rebound.    Skin: Warm, Dry, No erythema, No rash.   Back: No tenderness, No CVA tenderness.   Extremities: Intact distal pulses, No edema, No tenderness   Neurologic: Alert & oriented x 4, Normal motor function, Normal sensory function, No focal deficits noted.  Psychiatric: Appropriate    DIAGNOSTIC STUDIES / PROCEDURES\    LABS  Results for orders placed or performed during the hospital encounter of 12/14/22   URINE DRUG SCREEN   Result Value Ref Range    Amphetamines Urine Positive (A) Negative    Barbiturates Negative Negative    Benzodiazepines Negative Negative    Cocaine Metabolite " Negative Negative    Methadone Positive (A) Negative    Opiates Negative Negative    Oxycodone Negative Negative    Phencyclidine -Pcp Negative Negative    Propoxyphene Negative Negative    Cannabinoid Metab Positive (A) Negative   URINALYSIS    Specimen: Urine, Clean Catch   Result Value Ref Range    Color Yellow     Character Clear     Specific Gravity 1.026 <1.035    Ph 7.5 5.0 - 8.0    Glucose Negative Negative mg/dL    Ketones Negative Negative mg/dL    Protein Negative Negative mg/dL    Bilirubin Negative Negative    Urobilinogen, Urine 0.2 Negative    Nitrite Negative Negative    Leukocyte Esterase Negative Negative    Occult Blood Negative Negative    Micro Urine Req see below       All labs reviewed by me.    RADIOLOGY  CT-HEAD W/O   Final Result      No acute intracranial abnormality.           The radiologist's interpretation of all radiological studies have been reviewed by me.    COURSE & MEDICAL DECISION MAKING  Nursing notes, VS, PMSFHx reviewed in chart.     Patient presents to the emergency department with possible seizure.  Patients girlfriend reports patient witnessed full body shaking.  He has been fatigued since.  He is sleepy here in the emergency department but does arouse to my voice.  He is protecting his airway.  He is not hypoxic.  He is not having any fevers and I doubt an infectious etiology for his sleepiness.  Reportedly had a similar episode about a month ago but has not seen neurology yet.  Denies any new medications.  No focal findings on neurologic exam.  No evidence of tongue abrasions or bladder incontinence.  History does not suggest patient drinks heavily where this is an alcohol withdrawal seizure.  Plan is to check electrolytes, evaluate his white count for infection although I think it is unlikely and obtain urine drug screen and CT imaging of his brain.    5:04 PM - Patient seen and examined at bedside. The patient presents with seizure-like activity and drowsiness. Ordered  for CT-Head, UA, UDS, Diagnostic Alcohol, CBC w/ differential, and CMP to evaluate.    5:50 PM - Per RN, patient is refusing blood draws but did provide us with a urine sample.  Patient is awake and alert enough to understand the risk benefits of refusing the blood draw.    6:26 PM - Review of patient's radiology results is unrevealing. No evidence of intracranial abnormality.     7:32 PM - Patient's urine drug screen is positive for amphetamines, cannabis, and methadone, which is the most likely cause of his sleepy presentation today.  It is unclear what caused his possible seizure today.  I do not think at this point I should start seizure medications until he can see neurology and get an EEG.    7:48 PM - I reevaluated the patient at bedside and updated him on his results, as outlined above. He is not to drive until cleared by Neurology, and I have filled out the appropriate DMV form. Return precautions given for repeat seizures or new symptoms. I discussed the plan for discharge with referral to Neurology, as outlined below, and gave the patient the opportunity to ask questions. He understands and agrees to the plan for discharge. He will call his significant other to come take him home.     The patient will return for new or worsening symptoms and is stable at the time of discharge.    The patient is referred to a primary physician for blood pressure management, diabetic screening, and for all other preventative health concerns.      DISPOSITION:  Patient will be discharged home in stable condition.    FOLLOW UP:  [facility], Emergency Dept  [address] [address] [phone]    If symptoms worsen, return to the er.      FINAL IMPRESSION  1. Seizure-like activity (HCC)    2. Amphetamine abuse (HCC)          I, Jaki Antonio (Scrmaurice), am scribing for, and in the presence of, Margaret Gabriel M.D..    Electronically signed by: Jaki Antonio (Reed),  12/14/2022    IMargaret M.D. personally performed the services described in this documentation, as scribed by Jaki Antonio in my presence, and it is both accurate and complete.    The note accurately reflects work and decisions made by me.  Margaret Gabriel M.D.  12/14/2022  11:05 PM

## 2022-12-15 NOTE — ED NOTES
Patient educated on discharge instructions, follow up appointments, prescriptions, and home care. Patient verbalized understanding. Patient ambulated to [facility].

## 2023-04-16 NOTE — DISCHARGE INSTRUCTIONS
You cannot drive until you are cleared by neurology that you do not have a seizure disorder.  I put another referral in to be seen by neurology.  Go home and rest.  Any repeat seizure or new or different symptoms return.  Your evaluation was slightly limited as you refused blood work.  If you change your mind or have any worsening symptoms I do recommend returning for this aspect of your evaluation.  Happy birthday!   Yes